# Patient Record
Sex: MALE | Race: WHITE | NOT HISPANIC OR LATINO | URBAN - METROPOLITAN AREA
[De-identification: names, ages, dates, MRNs, and addresses within clinical notes are randomized per-mention and may not be internally consistent; named-entity substitution may affect disease eponyms.]

---

## 2020-06-10 ENCOUNTER — OFFICE (OUTPATIENT)
Dept: URBAN - METROPOLITAN AREA CLINIC 19 | Facility: CLINIC | Age: 20
End: 2020-06-10

## 2020-06-10 VITALS
HEIGHT: 72 IN | DIASTOLIC BLOOD PRESSURE: 69 MMHG | SYSTOLIC BLOOD PRESSURE: 119 MMHG | WEIGHT: 174 LBS | HEART RATE: 67 BPM

## 2020-06-10 DIAGNOSIS — R19.4 CHANGE IN BOWEL HABIT: ICD-10-CM

## 2020-06-10 DIAGNOSIS — T50.905A ADVERSE EFFECT OF UNSPECIFIED DRUGS, MEDICAMENTS AND BIOLOGI: ICD-10-CM

## 2020-06-10 DIAGNOSIS — R11.10 VOMITING, UNSPECIFIED: ICD-10-CM

## 2020-06-10 PROCEDURE — 99202 OFFICE O/P NEW SF 15 MIN: CPT | Performed by: INTERNAL MEDICINE

## 2021-02-22 ENCOUNTER — OFFICE (OUTPATIENT)
Dept: URBAN - METROPOLITAN AREA TELEHEALTH 10 | Facility: TELEHEALTH | Age: 21
End: 2021-02-22

## 2021-02-22 VITALS — HEIGHT: 72 IN

## 2021-02-22 DIAGNOSIS — K21.9 GASTRO-ESOPHAGEAL REFLUX DISEASE WITHOUT ESOPHAGITIS: ICD-10-CM

## 2021-02-22 DIAGNOSIS — R11.10 VOMITING, UNSPECIFIED: ICD-10-CM

## 2021-02-22 NOTE — SERVICEHPINOTES
PRIOR HISTORYJamshid is a 20 year old male here to discuss Frequent throat clearing-> [place] in [place] revealing "GERD", and was started on famotidine and pantoprazole. Took them for 2 months and while on such was having irregular bowel habits and "horribly closed up throat". Once he stopped such he has continued to have some food regurgitation and irregular bowel habits. He does not want to get back on the PPI or H2 blockerHas had a second ENT opinion and the laryngoscopy "suggested allergies".His irregular bowel habitsFOBT was negativeStool FOBT and WBC negativeBRCBC, CMP, ferritin, H Pylori Ab, amylase, and lipase normal 3/2020BM range 0-1/day. Color and consistency is what is typically irregular.No obvious relation to gluten.He state "I can't really tell if this is all just anxiety". He can't pinpoint if things he experienced are "normal", but just noticeable and can be amplified.As time goes things are steadily are better.No Involuntary weight loss, dysphagia, odynophagia, melena, hematemesis, abnormal imaging, persistent vomiting.UPDATEBRFor his regurgitation we did an upper GI which was normal besides reflux to the lower esophagus. He can tried a PPI but again felt like his throat would close up and his reflux was worse with it.  Instead he has been coping with Tums as needed.  He also states that Nasacort and yoga has helped (the breathing)No new issues.  Part of discussion was focused on the upper GI findings and how they are reassuring.  He agrees.BR